# Patient Record
Sex: FEMALE | Race: WHITE | Employment: FULL TIME | URBAN - NONMETROPOLITAN AREA
[De-identification: names, ages, dates, MRNs, and addresses within clinical notes are randomized per-mention and may not be internally consistent; named-entity substitution may affect disease eponyms.]

---

## 2020-12-28 ENCOUNTER — HOSPITAL ENCOUNTER (EMERGENCY)
Age: 45
Discharge: ANOTHER HOSPITAL | End: 2020-12-28
Payer: COMMERCIAL

## 2020-12-28 VITALS
HEIGHT: 63 IN | DIASTOLIC BLOOD PRESSURE: 76 MMHG | SYSTOLIC BLOOD PRESSURE: 129 MMHG | OXYGEN SATURATION: 98 % | HEART RATE: 77 BPM | WEIGHT: 190 LBS | TEMPERATURE: 98.2 F | BODY MASS INDEX: 33.66 KG/M2 | RESPIRATION RATE: 18 BRPM

## 2020-12-28 DIAGNOSIS — R10.9 LEFT FLANK PAIN: Primary | ICD-10-CM

## 2020-12-28 DIAGNOSIS — R31.1 BENIGN ESSENTIAL MICROSCOPIC HEMATURIA: ICD-10-CM

## 2020-12-28 LAB
BILIRUBIN URINE: NEGATIVE
BLOOD, URINE: ABNORMAL
CHARACTER, URINE: CLEAR
COLOR: YELLOW
GLUCOSE URINE: NEGATIVE MG/DL
KETONES, URINE: NEGATIVE
LEUKOCYTE ESTERASE, URINE: NEGATIVE
NITRITE, URINE: NEGATIVE
PH, URINE: 6.5 (ref 5–9)
PROTEIN, URINE: NEGATIVE MG/DL
SPECIFIC GRAVITY, URINE: 1.02 (ref 1–1.03)
UROBILINOGEN, URINE: 0.2 EU/DL (ref 0.2–1)

## 2020-12-28 PROCEDURE — 81003 URINALYSIS AUTO W/O SCOPE: CPT

## 2020-12-28 PROCEDURE — 99203 OFFICE O/P NEW LOW 30 MIN: CPT | Performed by: NURSE PRACTITIONER

## 2020-12-28 PROCEDURE — 99205 OFFICE O/P NEW HI 60 MIN: CPT

## 2020-12-28 ASSESSMENT — ENCOUNTER SYMPTOMS
VOMITING: 0
BACK PAIN: 1
DIARRHEA: 0
CONSTIPATION: 0
ABDOMINAL PAIN: 1
NAUSEA: 0
BLOOD IN STOOL: 0
SHORTNESS OF BREATH: 0

## 2020-12-28 ASSESSMENT — PAIN SCALES - GENERAL: PAINLEVEL_OUTOF10: 7

## 2020-12-28 ASSESSMENT — PAIN DESCRIPTION - ONSET: ONSET: GRADUAL

## 2020-12-28 ASSESSMENT — PAIN DESCRIPTION - FREQUENCY: FREQUENCY: CONTINUOUS

## 2020-12-28 ASSESSMENT — PAIN DESCRIPTION - LOCATION: LOCATION: FLANK

## 2020-12-28 ASSESSMENT — PAIN - FUNCTIONAL ASSESSMENT: PAIN_FUNCTIONAL_ASSESSMENT: ACTIVITIES ARE NOT PREVENTED

## 2020-12-28 ASSESSMENT — PAIN DESCRIPTION - DESCRIPTORS: DESCRIPTORS: ACHING;SORE

## 2020-12-28 ASSESSMENT — PAIN DESCRIPTION - ORIENTATION: ORIENTATION: LEFT

## 2020-12-28 NOTE — ED NOTES
Report to [address] [place] [address] by JUAN Garibay CNP.        Anette Velasquez RN  12/28/20 0392

## 2020-12-28 NOTE — ED PROVIDER NOTES
[facility]  Urgent Care Encounter       CHIEF COMPLAINT       Chief Complaint   Patient presents with    Flank Pain     left    Urinary Frequency       Nurses Notes reviewed and I agree except as noted in the HPI. HISTORY OF PRESENT ILLNESS   Cal Negro is a 41 y.o. female who presents with complaints of left flank pain that started yesterday. She states her flank pain radiates to her left upper quadrant and has been persistent. She describes the pain as sharp and stabbing rated 8 out of 10 in intensity. She denies any nausea or vomiting or suprapubic pain. She denies any known hematuria, dysuria, urgency. She does admit to urinary frequency. She has been drinking lots of soda and has a family history of kidney stones with her mom. She has never had a kidney stone in the past.  She denies any fever, chills, or shortness of breath. She has tried ibuprofen for treatment of pain which is not provided any relief. REVIEW OF SYSTEMS     Review of Systems   Constitutional: Negative for chills, fatigue and fever. Respiratory: Negative for shortness of breath. Cardiovascular: Negative for chest pain. Gastrointestinal: Positive for abdominal pain (LUQ). Negative for blood in stool, constipation, diarrhea, nausea and vomiting. Genitourinary: Positive for flank pain (Left) and frequency. Negative for dysuria, hematuria and urgency. Musculoskeletal: Positive for back pain. Skin: Negative for rash and wound. Neurological: Negative for weakness. PAST MEDICAL HISTORY   History reviewed. No pertinent past medical history. SURGICALHISTORY     Patient  has no past surgical history on file. CURRENT MEDICATIONS       Previous Medications    No medications on file       ALLERGIES     Patient is has No Known Allergies. Patients   There is no immunization history on file for this patient. FAMILY HISTORY     Patient's family history is not on file.     SOCIAL HISTORY     Patient  reports that she has been smoking. She has been smoking about 0.50 packs per day. She has never used smokeless tobacco. She reports current alcohol use. She reports that she does not use drugs. PHYSICAL EXAM     ED TRIAGE VITALS  BP: 129/76, Temp: 98.2 °F (36.8 °C), Pulse: 77, Resp: 18, SpO2: 98 %,Estimated body mass index is 33.66 kg/m² as calculated from the following:    Height as of this encounter: 5' 3\" (1.6 m). Weight as of this encounter: 190 lb (86.2 kg). ,Patient's last menstrual period was 12/25/2020. Physical Exam  Vitals signs and nursing note reviewed. Constitutional:       General: She is not in acute distress. Appearance: Normal appearance. Cardiovascular:      Rate and Rhythm: Normal rate and regular rhythm. Heart sounds: Normal heart sounds. Pulmonary:      Effort: Pulmonary effort is normal.      Breath sounds: Normal breath sounds. Abdominal:      General: Abdomen is flat. Bowel sounds are normal.      Palpations: Abdomen is soft. Tenderness: There is abdominal tenderness (LUQ). There is left CVA tenderness. There is no guarding. Skin:     General: Skin is warm and dry. Neurological:      Mental Status: She is alert and oriented to person, place, and time.    Psychiatric:         Behavior: Behavior normal.       DIAGNOSTIC RESULTS     Labs:  Results for orders placed or performed during the hospital encounter of 12/28/20   Urinalysis   Result Value Ref Range    Glucose, Ur Negative NEGATIVE mg/dl    Bilirubin Urine Negative NEGATIVE    Ketones, Urine Negative NEGATIVE    Specific Gravity, Urine 1.025 1.002 - 1.030    Blood, Urine Small (A) NEGATIVE    pH, Urine 6.50 5.0 - 9.0    Protein, Urine Negative NEGATIVE mg/dl    Urobilinogen, Urine 0.20 0.2 - 1.0 eu/dl    Nitrite, Urine Negative NEGATIVE    Leukocyte Esterase, Urine Negative NEGATIVE    Color, UA Yellow STRAW-YELLOW    Character, Urine Clear CLEAR-SL CLOUD IMAGING:  None    EKG:  None    URGENT CARE COURSE:     Vitals:    12/28/20 1708   BP: 129/76   Pulse: 77   Resp: 18   Temp: 98.2 °F (36.8 °C)   TempSrc: Oral   SpO2: 98%   Weight: 190 lb (86.2 kg)   Height: 5' 3\" (1.6 m)     Medications - No data to display       PROCEDURES:  None    FINAL IMPRESSION      1. Left flank pain    2. Benign essential microscopic hematuria      DISPOSITION/ PLAN   DISPOSITION Decision To Transfer 12/28/2020 05:37:26 PM     Clinical exam consistent with concerns for acute kidney stone. Negative urinalysis except for small blood. Given clinical data and pain presenting, highly suspect kidney stone obstruction. Recommended she be transferred to [facility] ER per her preference for immediate CT and/or renal ultrasound for rule out and also pain control. She voiced understanding and was agreeable to the above-mentioned plan. Report was given to Alethea Bautista RN charge nurse at [facility] ER under the care of Dr. Saray Rivera. She was discharged in stable condition. PATIENT REFERRED TO:  Gali Kramer DO  [address] [id number] / [facility] [id number]      DISCHARGE MEDICATIONS:  New Prescriptions    No medications on file       Discontinued Medications    No medications on file       There are no discharge medications for this patient.       BAILEY Villarreal CNP    (Please note that portions of this note were completed with a voice recognition program. Efforts were made to edit the dictations but occasionally words are mis-transcribed.)             BAILEY Villarreal CNP  12/28/20 [id number]

## 2020-12-28 NOTE — ED TRIAGE NOTES
Pt to [facility] ambulatory with left flank pain and frequency on urination. This started last night. No blood noted in urine.